# Patient Record
Sex: MALE | Race: WHITE | NOT HISPANIC OR LATINO | URBAN - METROPOLITAN AREA
[De-identification: names, ages, dates, MRNs, and addresses within clinical notes are randomized per-mention and may not be internally consistent; named-entity substitution may affect disease eponyms.]

---

## 2017-01-09 ENCOUNTER — AMBULATORY - HEALTHEAST (OUTPATIENT)
Dept: CARDIOLOGY | Facility: CLINIC | Age: 82
End: 2017-01-09

## 2017-01-09 DIAGNOSIS — Z95.810 ICD (IMPLANTABLE CARDIOVERTER-DEFIBRILLATOR) IN PLACE: ICD-10-CM

## 2017-01-10 ENCOUNTER — COMMUNICATION - HEALTHEAST (OUTPATIENT)
Dept: CARDIOLOGY | Facility: CLINIC | Age: 82
End: 2017-01-10

## 2017-02-23 ENCOUNTER — COMMUNICATION - HEALTHEAST (OUTPATIENT)
Dept: CARDIOLOGY | Facility: CLINIC | Age: 82
End: 2017-02-23

## 2017-02-23 DIAGNOSIS — I25.10 CAD (CORONARY ARTERY DISEASE): ICD-10-CM

## 2017-02-23 RX ORDER — CLOPIDOGREL BISULFATE 75 MG/1
75 TABLET ORAL DAILY
Qty: 90 TABLET | Refills: 1 | Status: SHIPPED | OUTPATIENT
Start: 2017-02-23

## 2017-02-27 ENCOUNTER — AMBULATORY - HEALTHEAST (OUTPATIENT)
Dept: CARDIOLOGY | Facility: CLINIC | Age: 82
End: 2017-02-27

## 2017-02-27 ENCOUNTER — COMMUNICATION - HEALTHEAST (OUTPATIENT)
Dept: CARDIOLOGY | Facility: CLINIC | Age: 82
End: 2017-02-27

## 2017-02-27 DIAGNOSIS — Z95.810 ICD (IMPLANTABLE CARDIOVERTER-DEFIBRILLATOR), BIVENTRICULAR, IN SITU: ICD-10-CM

## 2017-03-10 ENCOUNTER — AMBULATORY - HEALTHEAST (OUTPATIENT)
Dept: CARDIOLOGY | Facility: CLINIC | Age: 82
End: 2017-03-10

## 2017-03-10 DIAGNOSIS — Z95.810 ICD (IMPLANTABLE CARDIOVERTER-DEFIBRILLATOR), BIVENTRICULAR, IN SITU: ICD-10-CM

## 2017-03-10 DIAGNOSIS — I47.20 VT (VENTRICULAR TACHYCARDIA) (H): ICD-10-CM

## 2017-03-10 DIAGNOSIS — Z79.899 ON AMIODARONE THERAPY: ICD-10-CM

## 2017-03-10 DIAGNOSIS — I49.5 SINOATRIAL NODE DYSFUNCTION (H): ICD-10-CM

## 2017-03-10 DIAGNOSIS — I25.89 OTHER SPECIFIED FORMS OF CHRONIC ISCHEMIC HEART DISEASE: ICD-10-CM

## 2017-03-10 DIAGNOSIS — Z79.899 LONG TERM USE OF DRUG: ICD-10-CM

## 2017-03-10 LAB — ALT SERPL W P-5'-P-CCNC: 23 U/L (ref 0–45)

## 2017-03-10 RX ORDER — AMIODARONE HYDROCHLORIDE 200 MG/1
TABLET ORAL
Qty: 60 TABLET | Refills: 3 | Status: SHIPPED
Start: 2017-03-10

## 2017-03-10 RX ORDER — METOPROLOL TARTRATE 50 MG
TABLET ORAL
Status: SHIPPED | COMMUNITY
Start: 2017-02-24

## 2017-03-10 ASSESSMENT — MIFFLIN-ST. JEOR: SCORE: 1520.9

## 2017-03-16 ENCOUNTER — AMBULATORY - HEALTHEAST (OUTPATIENT)
Dept: CARDIOLOGY | Facility: CLINIC | Age: 82
End: 2017-03-16

## 2017-06-06 ENCOUNTER — AMBULATORY - HEALTHEAST (OUTPATIENT)
Dept: CARDIOLOGY | Facility: CLINIC | Age: 82
End: 2017-06-06

## 2017-06-06 DIAGNOSIS — Z95.810 ICD (IMPLANTABLE CARDIOVERTER-DEFIBRILLATOR), BIVENTRICULAR, IN SITU: ICD-10-CM

## 2017-06-06 LAB — HCC DEVICE COMMENTS: NORMAL

## 2017-09-12 ENCOUNTER — AMBULATORY - HEALTHEAST (OUTPATIENT)
Dept: CARDIOLOGY | Facility: CLINIC | Age: 82
End: 2017-09-12

## 2017-09-13 ENCOUNTER — AMBULATORY - HEALTHEAST (OUTPATIENT)
Dept: CARDIOLOGY | Facility: CLINIC | Age: 82
End: 2017-09-13

## 2017-10-18 ENCOUNTER — RECORDS - HEALTHEAST (OUTPATIENT)
Dept: ADMINISTRATIVE | Facility: OTHER | Age: 82
End: 2017-10-18

## 2017-10-18 ENCOUNTER — AMBULATORY - HEALTHEAST (OUTPATIENT)
Dept: CARDIOLOGY | Facility: CLINIC | Age: 82
End: 2017-10-18

## 2017-10-20 ENCOUNTER — AMBULATORY - HEALTHEAST (OUTPATIENT)
Dept: CARDIOLOGY | Facility: CLINIC | Age: 82
End: 2017-10-20

## 2017-10-20 DIAGNOSIS — I47.29 PAROXYSMAL VENTRICULAR TACHYCARDIA (H): ICD-10-CM

## 2017-10-20 DIAGNOSIS — Z95.810 ICD (IMPLANTABLE CARDIOVERTER-DEFIBRILLATOR), BIVENTRICULAR, IN SITU: ICD-10-CM

## 2017-10-20 DIAGNOSIS — I25.5 ISCHEMIC CARDIOMYOPATHY: ICD-10-CM

## 2017-10-20 DIAGNOSIS — Z79.899 ON AMIODARONE THERAPY: ICD-10-CM

## 2017-10-20 DIAGNOSIS — I49.5 SINOATRIAL NODE DYSFUNCTION (H): ICD-10-CM

## 2017-10-20 LAB
ALT SERPL W P-5'-P-CCNC: 17 U/L (ref 0–45)
HCC DEVICE COMMENTS: NORMAL

## 2017-10-20 RX ORDER — METOPROLOL SUCCINATE 50 MG/1
25 TABLET, EXTENDED RELEASE ORAL DAILY
Qty: 90 TABLET | Refills: 4 | Status: SHIPPED
Start: 2017-10-20

## 2017-10-20 ASSESSMENT — MIFFLIN-ST. JEOR: SCORE: 1593.92

## 2017-12-05 ENCOUNTER — AMBULATORY - HEALTHEAST (OUTPATIENT)
Dept: CARDIOLOGY | Facility: CLINIC | Age: 82
End: 2017-12-05

## 2017-12-05 DIAGNOSIS — Z79.899 LONG TERM USE OF DRUG: ICD-10-CM

## 2018-01-01 ENCOUNTER — AMBULATORY - HEALTHEAST (OUTPATIENT)
Dept: CARDIOLOGY | Facility: CLINIC | Age: 83
End: 2018-01-01

## 2018-01-01 DIAGNOSIS — Z95.810 ICD (IMPLANTABLE CARDIOVERTER-DEFIBRILLATOR), BIVENTRICULAR, IN SITU: ICD-10-CM

## 2018-01-01 DIAGNOSIS — I49.5 SINOATRIAL NODE DYSFUNCTION (H): ICD-10-CM

## 2018-01-01 DIAGNOSIS — I47.29 PAROXYSMAL VENTRICULAR TACHYCARDIA (H): ICD-10-CM

## 2018-01-01 DIAGNOSIS — I25.5 ISCHEMIC CARDIOMYOPATHY: ICD-10-CM

## 2018-01-01 DIAGNOSIS — Z79.899 LONG TERM USE OF DRUG: ICD-10-CM

## 2018-01-01 DIAGNOSIS — Z79.899 ON AMIODARONE THERAPY: ICD-10-CM

## 2018-01-01 LAB
HCC DEVICE COMMENTS: NORMAL
HCC DEVICE IMPLANTING PROVIDER: NORMAL
HCC DEVICE MANUFACTURE: NORMAL
HCC DEVICE MODEL: NORMAL
HCC DEVICE SERIAL NUMBER: NORMAL
HCC DEVICE TYPE: NORMAL

## 2018-01-01 ASSESSMENT — MIFFLIN-ST. JEOR: SCORE: 1637.47

## 2018-01-24 ENCOUNTER — AMBULATORY - HEALTHEAST (OUTPATIENT)
Dept: CARDIOLOGY | Facility: CLINIC | Age: 83
End: 2018-01-24

## 2018-01-24 DIAGNOSIS — Z95.810 ICD (IMPLANTABLE CARDIOVERTER-DEFIBRILLATOR), BIVENTRICULAR, IN SITU: ICD-10-CM

## 2018-01-24 LAB — HCC DEVICE COMMENTS: NORMAL

## 2018-02-26 ENCOUNTER — AMBULATORY - HEALTHEAST (OUTPATIENT)
Dept: CARDIOLOGY | Facility: CLINIC | Age: 83
End: 2018-02-26

## 2019-01-01 ENCOUNTER — AMBULATORY - HEALTHEAST (OUTPATIENT)
Dept: CARDIOLOGY | Facility: CLINIC | Age: 84
End: 2019-01-01

## 2019-01-01 ENCOUNTER — RECORDS - HEALTHEAST (OUTPATIENT)
Dept: LAB | Facility: CLINIC | Age: 84
End: 2019-01-01

## 2019-01-01 ENCOUNTER — COMMUNICATION - HEALTHEAST (OUTPATIENT)
Dept: CARDIOLOGY | Facility: CLINIC | Age: 84
End: 2019-01-01

## 2019-01-01 DIAGNOSIS — Z95.810 ICD (IMPLANTABLE CARDIOVERTER-DEFIBRILLATOR), BIVENTRICULAR, IN SITU: ICD-10-CM

## 2019-01-01 LAB
25(OH)D3 SERPL-MCNC: 15.3 NG/ML (ref 30–80)
ALBUMIN SERPL-MCNC: 3.5 G/DL (ref 3.5–5)
ALBUMIN UR-MCNC: ABNORMAL MG/DL
ALBUMIN UR-MCNC: NEGATIVE MG/DL
ALP SERPL-CCNC: 84 U/L (ref 45–120)
ALT SERPL W P-5'-P-CCNC: 13 U/L (ref 0–45)
ALT SERPL W P-5'-P-CCNC: 14 U/L (ref 0–45)
ANION GAP SERPL CALCULATED.3IONS-SCNC: 8 MMOL/L (ref 5–18)
APPEARANCE UR: ABNORMAL
APPEARANCE UR: ABNORMAL
AST SERPL W P-5'-P-CCNC: 16 U/L (ref 0–40)
BACTERIA #/AREA URNS HPF: ABNORMAL HPF
BACTERIA #/AREA URNS HPF: ABNORMAL HPF
BACTERIA SPEC CULT: ABNORMAL
BACTERIA SPEC CULT: ABNORMAL
BACTERIA SPEC CULT: NO GROWTH
BASOPHILS # BLD AUTO: 0.1 THOU/UL (ref 0–0.2)
BASOPHILS NFR BLD AUTO: 1 % (ref 0–2)
BILIRUB SERPL-MCNC: 0.8 MG/DL (ref 0–1)
BILIRUB UR QL STRIP: NEGATIVE
BILIRUB UR QL STRIP: NEGATIVE
BUN SERPL-MCNC: 27 MG/DL (ref 8–28)
CALCIUM SERPL-MCNC: 9.5 MG/DL (ref 8.5–10.5)
CHLORIDE BLD-SCNC: 104 MMOL/L (ref 98–107)
CHOLEST SERPL-MCNC: 116 MG/DL
CO2 SERPL-SCNC: 25 MMOL/L (ref 22–31)
COLOR UR AUTO: YELLOW
COLOR UR AUTO: YELLOW
CREAT SERPL-MCNC: 1.68 MG/DL (ref 0.7–1.3)
EOSINOPHIL # BLD AUTO: 0.3 THOU/UL (ref 0–0.4)
EOSINOPHIL NFR BLD AUTO: 3 % (ref 0–6)
ERYTHROCYTE [DISTWIDTH] IN BLOOD BY AUTOMATED COUNT: 13.8 % (ref 11–14.5)
ERYTHROCYTE [DISTWIDTH] IN BLOOD BY AUTOMATED COUNT: 14.1 % (ref 11–14.5)
FASTING STATUS PATIENT QL REPORTED: NORMAL
GFR SERPL CREATININE-BSD FRML MDRD: 39 ML/MIN/1.73M2
GLUCOSE BLD-MCNC: 116 MG/DL (ref 70–125)
GLUCOSE UR STRIP-MCNC: NEGATIVE MG/DL
GLUCOSE UR STRIP-MCNC: NEGATIVE MG/DL
HCC DEVICE COMMENTS: NORMAL
HCC DEVICE IMPLANTING PROVIDER: NORMAL
HCC DEVICE MANUFACTURE: NORMAL
HCC DEVICE MODEL: NORMAL
HCC DEVICE SERIAL NUMBER: NORMAL
HCC DEVICE TYPE: NORMAL
HCT VFR BLD AUTO: 32.1 % (ref 40–54)
HCT VFR BLD AUTO: 33.1 % (ref 40–54)
HDLC SERPL-MCNC: 43 MG/DL
HGB BLD-MCNC: 10.5 G/DL (ref 14–18)
HGB BLD-MCNC: 10.5 G/DL (ref 14–18)
HGB UR QL STRIP: ABNORMAL
HGB UR QL STRIP: ABNORMAL
HYALINE CASTS #/AREA URNS LPF: ABNORMAL LPF
HYALINE CASTS #/AREA URNS LPF: ABNORMAL LPF
KETONES UR STRIP-MCNC: NEGATIVE MG/DL
KETONES UR STRIP-MCNC: NEGATIVE MG/DL
LDLC SERPL CALC-MCNC: 48 MG/DL
LEUKOCYTE ESTERASE UR QL STRIP: ABNORMAL
LEUKOCYTE ESTERASE UR QL STRIP: ABNORMAL
LYMPHOCYTES # BLD AUTO: 0.8 THOU/UL (ref 0.8–4.4)
LYMPHOCYTES NFR BLD AUTO: 8 % (ref 20–40)
MCH RBC QN AUTO: 31.1 PG (ref 27–34)
MCH RBC QN AUTO: 31.3 PG (ref 27–34)
MCHC RBC AUTO-ENTMCNC: 31.7 G/DL (ref 32–36)
MCHC RBC AUTO-ENTMCNC: 32.7 G/DL (ref 32–36)
MCV RBC AUTO: 96 FL (ref 80–100)
MCV RBC AUTO: 98 FL (ref 80–100)
MONOCYTES # BLD AUTO: 0.7 THOU/UL (ref 0–0.9)
MONOCYTES NFR BLD AUTO: 7 % (ref 2–10)
MUCOUS THREADS #/AREA URNS LPF: ABNORMAL LPF
MUCOUS THREADS #/AREA URNS LPF: ABNORMAL LPF
NEUTROPHILS # BLD AUTO: 8 THOU/UL (ref 2–7.7)
NEUTROPHILS NFR BLD AUTO: 81 % (ref 50–70)
NITRATE UR QL: NEGATIVE
NITRATE UR QL: NEGATIVE
PH UR STRIP: 5.5 [PH] (ref 4.5–8)
PH UR STRIP: 5.5 [PH] (ref 4.5–8)
PLATELET # BLD AUTO: 174 THOU/UL (ref 140–440)
PLATELET # BLD AUTO: 174 THOU/UL (ref 140–440)
PMV BLD AUTO: 9 FL (ref 8.5–12.5)
PMV BLD AUTO: 9.4 FL (ref 8.5–12.5)
POTASSIUM BLD-SCNC: 4.5 MMOL/L (ref 3.5–5)
PROT SERPL-MCNC: 7.7 G/DL (ref 6–8)
RBC # BLD AUTO: 3.36 MILL/UL (ref 4.4–6.2)
RBC # BLD AUTO: 3.38 MILL/UL (ref 4.4–6.2)
RBC #/AREA URNS AUTO: ABNORMAL HPF
RBC #/AREA URNS AUTO: ABNORMAL HPF
SODIUM SERPL-SCNC: 137 MMOL/L (ref 136–145)
SP GR UR STRIP: 1.01 (ref 1–1.03)
SP GR UR STRIP: 1.02 (ref 1–1.03)
SQUAMOUS #/AREA URNS AUTO: ABNORMAL LPF
SQUAMOUS #/AREA URNS AUTO: ABNORMAL LPF
TRIGL SERPL-MCNC: 126 MG/DL
TSH SERPL DL<=0.005 MIU/L-ACNC: 4.78 UIU/ML (ref 0.3–5)
TSH SERPL DL<=0.005 MIU/L-ACNC: 5.31 UIU/ML (ref 0.3–5)
UROBILINOGEN UR STRIP-ACNC: ABNORMAL
UROBILINOGEN UR STRIP-ACNC: ABNORMAL
VIT B12 SERPL-MCNC: 839 PG/ML (ref 213–816)
WBC #/AREA URNS AUTO: ABNORMAL HPF
WBC #/AREA URNS AUTO: ABNORMAL HPF
WBC CLUMPS #/AREA URNS HPF: PRESENT /[HPF]
WBC CLUMPS #/AREA URNS HPF: PRESENT /[HPF]
WBC: 7.8 THOU/UL (ref 4–11)
WBC: 9.9 THOU/UL (ref 4–11)
YEAST #/AREA URNS HPF: ABNORMAL HPF
YEAST #/AREA URNS HPF: ABNORMAL HPF

## 2021-05-30 VITALS — WEIGHT: 185.3 LBS | BODY MASS INDEX: 27.44 KG/M2 | HEIGHT: 69 IN

## 2021-05-31 VITALS — BODY MASS INDEX: 29.83 KG/M2 | WEIGHT: 201.4 LBS | HEIGHT: 69 IN

## 2021-06-02 ENCOUNTER — RECORDS - HEALTHEAST (OUTPATIENT)
Dept: ADMINISTRATIVE | Facility: CLINIC | Age: 86
End: 2021-06-02

## 2021-06-02 VITALS — HEIGHT: 69 IN | BODY MASS INDEX: 31.25 KG/M2 | WEIGHT: 211 LBS

## 2021-06-13 NOTE — PROGRESS NOTES
In clinic device check with Dr. Vegas.  Please see link for full device report.  Patient was informed of results and next follow up during today's visit.

## 2021-06-16 NOTE — TELEPHONE ENCOUNTER
Telephone Encounter by Mitra Johnson RN at 2/27/2019  2:39 PM     Author: Mitra Johnson RN Service: -- Author Type: Registered Nurse    Filed: 3/1/2019  8:52 AM Encounter Date: 2/27/2019 Status: Addendum    : Mitra Johnson RN (Registered Nurse)    Related Notes: Original Note by Mitra Johnson RN (Registered Nurse) filed at 2/28/2019  2:55 PM       Type: Routine remote CRT-D transmission.   Presenting: AP-BiV paced, rate 60 bpm.  Lead/Battery Status: Stable.   Atrial Arrhythmias: Since 11/30/18, none detected.   Vent Arrhythmias: Since 11/30/18, no VT or VF detected.   Comments: Normal CRT-D transmission. BiV pacing 99.6%. Will route to device RN  for review of heart failure diagnostics.   Alerts: None. KLP      Call to patient's listed home number, however, number is no longer in service. I was able get in touch with his daughter-in-law who states he is now in a Memory Care unit after his wife's recent passing. He is residing in [facility] in [place].  She states he is very forgetful and most communication should go through her. States he has been having prostate issues, but does not think there has been concern about s/s of HF. Requested I check with Care center.    Left Message for Care Center RNs to call back for assessment/follow up. No return calls received. Will monitor for now as he is under medical supervision/assessment in a care center.  Mitra Johnson, MARIA TERESA

## 2021-06-18 NOTE — LETTER
Letter by Karen Arora EPS at      Author: Karen Arora EPS Service: -- Author Type: --    Filed:  Encounter Date: 2/27/2019 Status: (Other)       Vlad Serra  [address] [facility] [address]      February 27, 2019      Dear Mr. Serra,    RE: Remote Results    We are writing to you regarding your recent Remote ICD check from home. Your transmission was received successfully. Battery status is satisfactory at this time.     Your results are being reviewed.    Your next device appointment will be a remote check on May 30th, 2019; this will occur automatically.    To schedule or reschedule, please call [phone] and press 1.    NOTE: If you would like to do an extra transmission, please call [phone] and press 3 to speak to a nurse BEFORE transmitting. This ensures that the Device Clinic staff is aware of the reason you are sending a transmission, and can follow-up with you after it has been reviewed.    We will be checking your implanted device from home (remotely) every three months unless otherwise instructed. We will need to see you in the clinic at least once a year. You may need to be seen in the clinic sooner depending on the results of your check.    Please be aware:    The follow-up schedule is like a Physician prescription.    Your remote monitor is paired to your specific implanted device.      Sincerely,    [facility]

## 2021-06-22 NOTE — PROGRESS NOTES
In clinic device check with Device RN and Dr. Vegas.  Please see link for full device report.  Patient was informed of results and next follow up during today's visit.

## 2021-06-25 NOTE — PROGRESS NOTES
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      Assessment/Recommendations   Clinic Problem List:  1. Ischemic Cardiomyopathy     2. VT (ventricular tachycardia)  amiodarone (PACERONE) 200 MG tablet   3. Sinoatrial node dysfunction     4. On amiodarone therapy         Assessment:    Ventricular tachycardia well suppressed on moderate dose amiodarone without apparent side effects  Sick sinus syndrome normally functioning CRT-D    Plan:  Decrease amiodarone dose slightly to pills on Mondays and Fridays, 1 pill 5 days per week  ALT and TSH was drawn today as part of amiodarone follow-up  Follow up appointment:   Dr. Vegas in device clinic in 6 months       History of Present Illness     Vlad Serra is a 81 y.o. male with ventricular tachycardia associated with ischemic cardiomyopathy. He presented in March, 2015 with the fatigue and was found to be in the ventricular tachycardia rate 115 bpm which is present for approximately one week before being pace terminated as an outpatient procedure. Follow-up echocardiogram again showed ejection fraction of 20% with evidence for anterior myocardial infarction. He continued to have intermittent problems with slow sustained ventricular tachycardia a single shock from his implantable defibrillator in August 2015. Vlad develop recurrent ventricular tachycardia with a proximally 6 ICD shocks on October 12, 2015. VT ablation was done with extensive ablation along the septum also lateral wall. He was readmitted with ventricular tachycardia on  November 17, 2015 with VT cycle length 360 ms and an ICD shock. A drug-eluting stent was placed to the distal LAD and he was restarted on amiodarone. Amiodarone was increased to 400 mg daily at that time.      He has a long history of ischemic cardiomyopathy with decreased left ventricular function. In 2009, his ejection fraction was 20%. In 2011ejection fraction was 15 to 20%. The patient had recurrent ventricular tachycardia in 04/2011. He underwent EP studies, mapping and ablation of VT associated with a large area of anterior akinesia. This may have been partially successful, but he had recurrent VT after ablation. Ventricular tachycardia have been fairly well suppressed on sotalol. The patient had three-vessel coronary artery bypass graft surgery with an internal mammary to the LAD and saphenous vein grafts to the intermediate and right coronary arteries. He also received a stent to the left main and upgrade of his device to biventricular ICD in March 2011. The patient has severe underlying sinus node dysfunction   Vlad has felt well although he describes more fatigue recently.  There is no exertional chest pain or shortness of breath.  No syncope or presyncope.    Personally reviewed.  ICD interrogation shows no VT episodes and no atrial arrhythmias is nearly 100% atrially paced and 100% by ventricularly paced with excellent thresholds on all 3 leads.  ICD longevity is greater than 4 years.       Physical Examination Review of Systems   Vitals:    03/10/17 1419   BP: 142/58   Pulse: 68   SpO2: 97%     Body mass index is 27.36 kg/(m^2).  Wt Readings from Last 3 Encounters:   03/10/17 185 lb 4.8 oz (84.1 kg)   10/11/16 194 lb (88 kg)   03/14/16 202 lb (91.6 kg)        Appearance:   no distress, early    HEENT:   no scleral icterus, normal conjunctivae    Neck: no carotid bruits or thyromegaly   Chest/Lungs:   lungs are clear to auscultation, no rales or wheezing, CD left subclavian pocket    Cardiovascular:    Jugular venous pressure 5 cm, Apical pulse is regular. Normal S1,S2 with no murmurs or gallops,   Abdomen:  no  Hepatosplenomegaly., nontender,  bowel sounds are present   Extremities: no cyanosis or clubbing, No edema   Skin: no xanthelasma, warm.    Neurologic: No gross focal neurologic deficits   Mood/Affect: Alert, cooperative    General: Weight Loss  Eyes: WNL  Ears/Nose/Throat: WNL  Lungs: WNL  Heart: Fainting  Stomach: WNL  Bladder: WNL  Muscle/Joints: WNL  Skin: WNL  Nervous System: WNL  Mental Health: WNL     Blood: WNL     Medical History  Surgical History Family History Social History   Past Medical History:   Diagnosis Date   ? Arrhythmia    ? Bladder infection May 2015   ? CHF (congestive heart failure)    ? Coronary artery disease    ? Hyperlipidemia    ? Hypertension    ? Ischemic cardiomyopathy    ? Paroxysmal VT    ? SA node dysfunction    ? Ventricular arrhythmia     Past Surgical History:   Procedure Laterality Date   ? CARDIAC CATHETERIZATION     ? CARDIAC ELECTROPHYSIOLOGY MAPPING AND ABLATION     ? cardiac stents     ? [place] CABG, VEIN, SINGLE      Description: CABG (CABG);  Proc Date: 11/05/2002;  Comments: HANS to LAD, SVG to OM1, SVG to PDA    No family history on file. Social History     Social History   ? Marital status:      Spouse name: N/A   ? Number of children: N/A   ? Years of education: N/A     Occupational History   ? Not on file.     Social History Main Topics   ? Smoking status: Former Smoker     Quit date: 3/6/1983   ? Smokeless tobacco: Not on file   ? Alcohol use No   ? Drug use: Not on file   ? Sexual activity: Not on file     Other Topics Concern   ? Not on file     Social History Narrative          Medications  Allergies   Current Outpatient Prescriptions   Medication Sig Dispense Refill   ? amiodarone (PACERONE) 200 MG tablet On M-F, take 2 tablets. 60 tablet 3   ? aspirin 81 MG EC tablet Take 81 mg by mouth daily.     ? atorvastatin (LIPITOR) 40 MG tablet Take 1 tablet  (40 mg total) by mouth bedtime. 90 tablet 4   ? clopidogrel (PLAVIX) 75 mg tablet Take 1 tablet (75 mg total) by mouth daily. 90 tablet 1   ? lisinopril (PRINIVIL,ZESTRIL) 10 MG tablet Take 10 mg by mouth daily.      ? metoprolol succinate (TOPROL XL) 50 MG 24 hr tablet Take 1 tablet (50 mg total) by mouth daily. 90 tablet 4   ? metoprolol tartrate (LOPRESSOR) 50 MG tablet        No current facility-administered medications for this visit.       Allergies   Allergen Reactions   ? Oxycodone-Acetaminophen Nausea And Vomiting

## 2021-06-25 NOTE — PROGRESS NOTES
Progress Notes by Rusty Vegas MD at 10/20/2017  1:20 PM     Author: Rusty Vegas MD Service: -- Author Type: Physician    Filed: 10/20/2017  1:42 PM Encounter Date: 10/20/2017 Status: Signed    : Rusty Vegas MD (Physician)           Click to link to [facility] HEART [facility] ELECTROPHYSIOLOGY NOTE    Today I had the opportunity to see  Vlad Serra for follow-up evaluation of ventricular tachycardia associated with ischemic cardiomyopathy .      Assessment/Recommendations   Clinic Problem List:  1. Paroxysmal ventricular tachycardia     2. Sinoatrial node dysfunction     3. Ischemic cardiomyopathy  metoprolol succinate (TOPROL XL) 50 MG 24 hr tablet   4. On amiodarone therapy  ALT    Thyroid Stimulating Hormone (TSH)       Assessment:    Ventricular tachycardia, extremely well suppressed on low-dose amiodarone  Sinus node dysfunction, normally functioning CRT-D  Ischemic cardiomyopathy functional class I    Plan:    ALT and TSH were drawn today as part of amiodarone follow-up.  Take metoprolol succinate 50 mg, one half tablet daily  Take amiodarone 200 mg 1 pill daily except Wednesdays and Sundays  Follow up appointment:   Dr. Vegas in 6 months in device clinic       History of Present Illness     Vlad Serra is a 82 y.o. male with ventricular tachycardia associated with ischemic cardiomyopathy. He presented in March, 2015 with the fatigue and was found to be in the ventricular tachycardia rate 115 bpm which is present for approximately one week before being pace terminated as an outpatient procedure. Follow-up echocardiogram again showed ejection fraction of 20% with evidence for anterior myocardial infarction. He continued to have intermittent problems with slow sustained ventricular tachycardia a single shock from his implantable defibrillator in August 2015. Vlad develop recurrent ventricular tachycardia with a proximally 6 ICD shocks on October 12, 2015. VT  ablation was done with extensive ablation along the septum also lateral wall. He was readmitted with ventricular tachycardia on November 17, 2015 with VT cycle length 360 ms and an ICD shock. A drug-eluting stent was placed to the distal LAD and he was restarted on amiodarone. Amiodarone dose is currently 200 mg 5 days per week.     Vlad reports feeling well.  He denies exertional chest pain or dyspnea on exertion.  There is been no palpitations lightheadedness syncope or presyncope.    He has a long history of ischemic cardiomyopathy with decreased left ventricular function. In 2009, his ejection fraction was 20%. In 2011 ejection fraction was 15 to 20%. The patient had recurrent ventricular tachycardia in 04/2011. He underwent EP studies, mapping and ablation of VT associated with a large area of anterior akinesia. This may have been partially successful, but he had recurrent VT after ablation. Ventricular tachycardia have been fairly well suppressed on sotalol. The patient had three-vessel coronary artery bypass graft surgery with an internal mammary to the LAD and saphenous vein grafts to the intermediate and right coronary arteries. He also received a stent to the left main and upgrade of his device to biventricular ICD in March 2011. The patient has severe underlying sinus node dysfunction  Personally reviewed.  TSH and ALT from March were normal.  ICD check today shows 100% atrial and 100% biventricular pacing with excellent thresholds in all chambers.  There is been no atrial arrhythmias and no ventricular tachycardia.  Estimated battery longevity is 3.8 years.       Physical Examination Review of Systems   Vitals:    10/20/17 1310   BP: 128/60   Pulse: 67   Resp: 16     Body mass index is 29.74 kg/(m^2).  Wt Readings from Last 3 Encounters:   10/20/17 201 lb 6.4 oz (91.4 kg)   03/10/17 185 lb 4.8 oz (84.1 kg)   10/11/16 194 lb (88 kg)        Appearance:   no distress,    HEENT:   no scleral icterus, normal  conjunctivae    Neck: no carotid bruits or thyromegaly   Chest/Lungs:   lungs are clear to auscultation, no rales or wheezing, midline sternotomy, ICD in left subclavian pocket.   Cardiovascular:   Jugular venous pressure 4 cm, Apical pulse is regular. Normal S1,S2 with no murmurs or gallops,   Abdomen:  no  Hepatosplenomegaly., nontender,  bowel sounds are present   Extremities: no cyanosis or clubbing, No edema   Skin: no xanthelasma, warm.    Neurologic: No gross focal neurologic deficits   Mood/Affect: Alert, cooperative    General: WNL  Eyes: WNL  Ears/Nose/Throat: WNL  Lungs: WNL  Heart: WNL  Stomach: WNL  Bladder: WNL  Muscle/Joints: WNL  Skin: WNL  Nervous System: WNL  Mental Health: WNL     Blood: WNL     Medical History  Surgical History Family History Social History   Past Medical History:   Diagnosis Date   ? Arrhythmia    ? Bladder infection May 2015   ? CHF (congestive heart failure)    ? Coronary artery disease    ? Hyperlipidemia    ? Hypertension    ? Ischemic cardiomyopathy    ? Paroxysmal VT    ? SA node dysfunction    ? Ventricular arrhythmia     Past Surgical History:   Procedure Laterality Date   ? CARDIAC CATHETERIZATION     ? CARDIAC ELECTROPHYSIOLOGY MAPPING AND ABLATION     ? cardiac stents     ? [place] CABG, VEIN, SINGLE      Description: CABG (CABG);  Proc Date: 11/05/2002;  Comments: HANS to LAD, SVG to OM1, SVG to PDA    No family history on file. Social History     Social History   ? Marital status:      Spouse name: N/A   ? Number of children: N/A   ? Years of education: N/A     Occupational History   ? Not on file.     Social History Main Topics   ? Smoking status: Former Smoker     Quit date: 3/6/1983   ? Smokeless tobacco: Not on file   ? Alcohol use No   ? Drug use: Not on file   ? Sexual activity: Not on file     Other Topics Concern   ? Not on file     Social History Narrative          Medications  Allergies   Current Outpatient Prescriptions   Medication Sig Dispense Refill    ? amiodarone (PACERONE) 200 MG tablet On M-F, take 2 tablets. 60 tablet 3   ? aspirin 81 MG EC tablet Take 81 mg by mouth daily.     ? atorvastatin (LIPITOR) 40 MG tablet Take 1 tablet (40 mg total) by mouth bedtime. 90 tablet 4   ? clopidogrel (PLAVIX) 75 mg tablet Take 1 tablet (75 mg total) by mouth daily. 90 tablet 1   ? lisinopril (PRINIVIL,ZESTRIL) 10 MG tablet Take 10 mg by mouth daily.      ? metoprolol succinate (TOPROL XL) 50 MG 24 hr tablet Take 0.5 tablets (25 mg total) by mouth daily. 90 tablet 4   ? metoprolol tartrate (LOPRESSOR) 50 MG tablet        No current facility-administered medications for this visit.       Allergies   Allergen Reactions   ? Oxycodone-Acetaminophen Nausea And Vomiting

## 2021-06-26 NOTE — PROGRESS NOTES
Progress Notes by Rusty Vegas MD at 11/30/2018  1:20 PM     Author: Rusty Vegas MD Service: -- Author Type: Physician    Filed: 11/30/2018  1:47 PM Encounter Date: 11/30/2018 Status: Signed    : Rusty Vegas MD (Physician)           Click to link to [facility] Heart [facility] HEART [facility] ELECTROPHYSIOLOGY NOTE    Today I had the opportunity to see  Vlad Serra for follow-up evaluation of ventricular tachycardia associated with ischemic cardiomyopathy.      Assessment/Recommendations   Clinic Problem List:  1. Paroxysmal ventricular tachycardia (H)     2. Ischemic cardiomyopathy     3. On amiodarone therapy     4. Sinoatrial Node Dysfunction         Assessment:    Paroxysmal ventricular tachycardia extremely well suppressed on low-dose with no recent episodes  Ischemic cardiomyopathy functional class I  Sinus node dysfunction 100% atrial AV sequential pacing    Plan:  Continue current medications  Follow a low carbohydrate diet with moderate exercise for weight reduction  Follow up appointment:   Dr. Vegas in device clinic in 1 year       History of Present Illness     Vlad Serra is a 83 y.o. male with ventricular tachycardia associated with ischemic cardiomyopathy.  He has a long history of ischemic cardiomyopathy with decreased left ventricular function. In 2009, his ejection fraction was 20%. In 2011 ejection fraction was 15 to 20%.The patient had three-vessel coronary artery bypass graft surgery with an internal mammary to the LAD and saphenous vein grafts to the intermediate and right coronary arteries. He also received a stent to the left main and upgrade of his device to biventricular ICD in March 2011.  The patient had recurrent ventricular tachycardia in 04/2011. He underwent EP studies, mapping and ablation of VT associated with a large area of anterior akinesia. This may have been partially successful, but he had recurrent VT after ablation.. He presented in March, 2015  with the fatigue and was found to be in the ventricular tachycardia rate 115 bpm which is present for approximately one week before being pace terminated as an outpatient procedure. Follow-up echocardiogram again showed ejection fraction of 20% with evidence for anterior myocardial infarction. He continued to have intermittent problems with slow sustained ventricular tachycardia a single shock from his implantable defibrillator in August 2015. Vlad develop recurrent ventricular tachycardia with a proximally 6 ICD shocks on October 12, 2015. VT ablation was done with extensive ablation along the septum also lateral wall. He was readmitted with ventricular tachycardia on November 17, 2015 with VT cycle length 360 ms and an ICD shock. A drug-eluting stent was placed to the distal LAD and he was restarted on amiodarone. Amiodarone dose is currently 200 mg 5 days per week.   Vlad has done well over the past year.  He continues woodworking.  He denies exertional chest pain or dyspnea on exertion.  He denies palpitations or shocks from his implantable defibrillator.  He is currently taking amiodarone 200 mg 5 days weekly.   Personally reviewed.  ICD check shows no atrial and no ventricular arrhythmias.  He has excellent pacing thresholds in all 3 chambers and is 100% AV sequentially paced with biventricular pacing.  Estimated battery longevity is 2.5 years.  TSH was 5.55 and ALT was 14 on June 7, 2018.       Physical Examination Review of Systems   Vitals:    11/30/18 1253   BP: 138/70   Pulse: 60   Resp: 18     Body mass index is 31.16 kg/m .  Wt Readings from Last 3 Encounters:   11/30/18 211 lb (95.7 kg)   10/20/17 201 lb 6.4 oz (91.4 kg)   03/10/17 185 lb 4.8 oz (84.1 kg)        Appearance:   no distress,    HEENT:   no scleral icterus, normal conjunctivae    Neck: no carotid bruits or thyromegaly   Chest/Lungs:   lungs are clear to auscultation, no rales or wheezing,    Cardiovascular:   Jugular venous pressure 6  cm, Apical pulse is quite regular. Normal S1,S2 with no murmurs or gallops,   Abdomen:  no  Hepatosplenomegaly., nontender,  bowel sounds are present   Extremities: no cyanosis or clubbing, No edema   Skin: no xanthelasma, warm.    Neurologic: No gross focal neurologic deficits   Mood/Affect: Alert, cooperative    General: WNL  Eyes: WNL  Ears/Nose/Throat: WNL  Lungs: WNL  Heart: WNL  Stomach: WNL  Bladder: WNL  Muscle/Joints: WNL  Skin: WNL  Nervous System: WNL  Mental Health: WNL     Blood: WNL     Medical History  Surgical History Family History Social History   Past Medical History:   Diagnosis Date   ? Arrhythmia    ? Bladder infection May 2015   ? CHF (congestive heart failure) (H)    ? Coronary artery disease    ? Hyperlipidemia    ? Hypertension    ? Ischemic cardiomyopathy    ? Paroxysmal VT (H)    ? SA node dysfunction (H)    ? Ventricular arrhythmia     Past Surgical History:   Procedure Laterality Date   ? CARDIAC CATHETERIZATION     ? CARDIAC ELECTROPHYSIOLOGY MAPPING AND ABLATION     ? cardiac stents     ? [place] CABG, VEIN, SINGLE      Description: CABG (CABG);  Proc Date: 2002;  Comments: HANS to LAD, SVG to OM1, SVG to PDA    No family history on file. Social History     Socioeconomic History   ? Marital status:      Spouse name: Not on file   ? Number of children: Not on file   ? Years of education: Not on file   ? Highest education level: Not on file   Social Needs   ? Financial resource strain: Not on file   ? Food insecurity - worry: Not on file   ? Food insecurity - inability: Not on file   ? Transportation needs - medical: Not on file   ? Transportation needs - non-medical: Not on file   Occupational History   ? Not on file   Tobacco Use   ? Smoking status: Former Smoker     Last attempt to quit: 3/6/1983     Years since quittin.7   ? Smokeless tobacco: Never Used   Substance and Sexual Activity   ? Alcohol use: No   ? Drug use: No   ? Sexual activity: Not on file   Other Topics  Concern   ? Not on file   Social History Narrative   ? Not on file          Medications  Allergies   Current Outpatient Medications   Medication Sig Dispense Refill   ? amiodarone (PACERONE) 200 MG tablet On M-F, take 2 tablets. 60 tablet 3   ? aspirin 81 MG EC tablet Take 81 mg by mouth daily.     ? atorvastatin (LIPITOR) 40 MG tablet Take 1 tablet (40 mg total) by mouth bedtime. 90 tablet 4   ? clopidogrel (PLAVIX) 75 mg tablet Take 1 tablet (75 mg total) by mouth daily. 90 tablet 1   ? lisinopril (PRINIVIL,ZESTRIL) 10 MG tablet Take 10 mg by mouth daily.      ? metoprolol succinate (TOPROL XL) 50 MG 24 hr tablet Take 0.5 tablets (25 mg total) by mouth daily. 90 tablet 4   ? metoprolol tartrate (LOPRESSOR) 50 MG tablet        No current facility-administered medications for this visit.       Allergies   Allergen Reactions   ? Oxycodone-Acetaminophen Nausea And Vomiting

## 2021-07-03 NOTE — ADDENDUM NOTE
Addendum Note by Ramona Dexter CMA at 3/10/2017  3:10 PM     Author: Ramona Dexter CMA Service: -- Author Type: Certified Medical Assistant    Filed: 3/10/2017  3:10 PM Encounter Date: 3/10/2017 Status: Signed    : Ramona Dexter CMA (Certified Medical Assistant)    Addended by: RAMONA DEXTER on: 3/10/2017 03:10 PM        Modules accepted: Orders

## 2021-07-03 NOTE — ADDENDUM NOTE
Addendum Note by Ramona Dexter CMA at 3/10/2017  3:09 PM     Author: Ramona Dexter CMA Service: -- Author Type: Certified Medical Assistant    Filed: 3/10/2017  3:09 PM Encounter Date: 3/10/2017 Status: Signed    : Ramona Dexter CMA (Certified Medical Assistant)    Addended by: RAMONA DEXTER on: 3/10/2017 03:09 PM        Modules accepted: Orders